# Patient Record
Sex: FEMALE | Race: WHITE | NOT HISPANIC OR LATINO | URBAN - METROPOLITAN AREA
[De-identification: names, ages, dates, MRNs, and addresses within clinical notes are randomized per-mention and may not be internally consistent; named-entity substitution may affect disease eponyms.]

---

## 2017-12-13 ENCOUNTER — OFFICE (OUTPATIENT)
Dept: URBAN - METROPOLITAN AREA CLINIC 33 | Facility: CLINIC | Age: 67
End: 2017-12-13

## 2017-12-13 VITALS
HEART RATE: 77 BPM | WEIGHT: 132 LBS | DIASTOLIC BLOOD PRESSURE: 71 MMHG | HEIGHT: 57 IN | SYSTOLIC BLOOD PRESSURE: 92 MMHG

## 2017-12-13 DIAGNOSIS — K21.9 GERD: ICD-10-CM

## 2017-12-13 DIAGNOSIS — Z12.11 SCREENING FOR COLONIC NEOPLASIA: ICD-10-CM

## 2017-12-13 PROCEDURE — 99205 OFFICE O/P NEW HI 60 MIN: CPT | Performed by: SPECIALIST

## 2017-12-13 RX ORDER — SODIUM SULFATE, POTASSIUM SULFATE, MAGNESIUM SULFATE 17.5; 3.13; 1.6 G/ML; G/ML; G/ML
SOLUTION, CONCENTRATE ORAL
Qty: 1 | Status: COMPLETED
Start: 2017-12-13 | End: 2018-04-09

## 2017-12-13 NOTE — SERVICEHPINOTES
The patient is seen for evaluation of constipation.    Notes the onset of constipation   many  years   ago.  Symptoms started   abruptly  .   Currently, the patient evacuates stool   1   times per   day  .    The stools are   hard  .   They are typically    in color.    Associated symptoms include   .  Symptoms are alleviated with   .  The patient has previously medicated symptoms with   .   The patient has undergone a colonoscopy    ago.  Findings on that exam included   .     The patient is seen for the evaluation of suspected GERD.    Noted the onset of   heartburn  many  years   ago  .   Symptoms have been occurring   3 - 5   time(s) per   day  .    During a given day, they are most prevalent   .    They are exacerbated by   .   In the past, the patient has tried   .   Currently takes    dosed   .   On this therapy, symptom response has been   .    Associated symptoms include   .      Has also noted atypical (non-esophageal) symptoms including   .    A prior EGD has been performed and showed   .

## 2018-01-16 ENCOUNTER — AMBULATORY SURGICAL CENTER (OUTPATIENT)
Dept: URBAN - METROPOLITAN AREA SURGERY 24 | Facility: SURGERY | Age: 68
End: 2018-01-16

## 2018-01-16 VITALS
OXYGEN SATURATION: 98 % | SYSTOLIC BLOOD PRESSURE: 160 MMHG | WEIGHT: 148 LBS | TEMPERATURE: 97.6 F | HEART RATE: 72 BPM | RESPIRATION RATE: 18 BRPM | HEIGHT: 57 IN | DIASTOLIC BLOOD PRESSURE: 83 MMHG

## 2018-01-16 DIAGNOSIS — Z12.11 ENCOUNTER FOR SCREENING FOR MALIGNANT NEOPLASM OF COLON: ICD-10-CM

## 2018-01-16 DIAGNOSIS — K64.1 SECOND DEGREE HEMORRHOIDS: ICD-10-CM

## 2018-01-16 DIAGNOSIS — K44.9 DIAPHRAGMATIC HERNIA WITHOUT OBSTRUCTION OR GANGRENE: ICD-10-CM

## 2018-01-16 DIAGNOSIS — K29.70 GASTRITIS, UNSPECIFIED, WITHOUT BLEEDING: ICD-10-CM

## 2018-01-16 PROBLEM — D12.3 BENIGN NEOPLASM OF TRANSVERSE COLON: Status: ACTIVE | Noted: 2018-01-16

## 2018-01-16 PROBLEM — R10.13 EPIGASTRIC PAIN: Status: ACTIVE | Noted: 2018-01-16

## 2018-01-16 PROBLEM — K20.9 ESOPHAGITIS, UNSPECIFIED: Status: ACTIVE | Noted: 2018-01-16

## 2018-01-16 PROBLEM — K62.1 RECTAL POLYP: Status: ACTIVE | Noted: 2018-01-16

## 2018-01-16 PROBLEM — R12 HEARTBURN: Status: ACTIVE | Noted: 2018-01-16

## 2018-01-16 PROBLEM — D12.5 BENIGN NEOPLASM OF SIGMOID COLON: Status: ACTIVE | Noted: 2018-01-16

## 2018-01-16 PROBLEM — K57.30 DVRTCLOS OF LG INT W/O PERFORATION OR ABSCESS W/O BLEEDING: Status: ACTIVE | Noted: 2018-01-16

## 2018-01-16 PROCEDURE — 43239 EGD BIOPSY SINGLE/MULTIPLE: CPT | Performed by: SPECIALIST

## 2018-01-16 PROCEDURE — 45385 COLONOSCOPY W/LESION REMOVAL: CPT | Performed by: SPECIALIST

## 2018-01-16 PROCEDURE — 45380 COLONOSCOPY AND BIOPSY: CPT | Mod: 59 | Performed by: SPECIALIST

## 2018-04-09 ENCOUNTER — OFFICE (OUTPATIENT)
Dept: URBAN - METROPOLITAN AREA CLINIC 33 | Facility: CLINIC | Age: 68
End: 2018-04-09

## 2018-04-09 VITALS — DIASTOLIC BLOOD PRESSURE: 68 MMHG | SYSTOLIC BLOOD PRESSURE: 110 MMHG | WEIGHT: 148 LBS | HEIGHT: 57 IN

## 2018-04-09 DIAGNOSIS — K44.9 DIAPHRAGMATIC HERNIA WITHOUT OBSTRUCTION OR GANGRENE: ICD-10-CM

## 2018-04-09 DIAGNOSIS — K21.9 GERD: ICD-10-CM

## 2018-04-09 PROCEDURE — 99214 OFFICE O/P EST MOD 30 MIN: CPT | Performed by: SPECIALIST

## 2018-04-09 NOTE — SERVICEHPINOTES
The patient is seen for the evaluation of suspected GERD.    Noted the onset of   heartburn  several  years   ago  .   Symptoms have been occurring   several   time(s) per   day  .    During a given day, they are most prevalent   .    They are exacerbated by   .   In the past, the patient has tried   .   Currently takes    dosed   .   On this therapy, symptom response has been   .    Associated symptoms include   .      Has also noted atypical (non-esophageal) symptoms including   .    A prior EGD has been performed and showed   .

## 2021-01-26 ENCOUNTER — HOSPITAL ENCOUNTER (OUTPATIENT)
Dept: HOSPITAL 54 - MSC | Age: 71
Discharge: HOME | End: 2021-01-26
Attending: ANESTHESIOLOGY
Payer: MEDICARE

## 2021-01-26 DIAGNOSIS — M79.641: ICD-10-CM

## 2021-01-26 DIAGNOSIS — M50.10: Primary | ICD-10-CM

## 2021-01-26 DIAGNOSIS — M50.20: ICD-10-CM

## 2021-03-31 ENCOUNTER — OFFICE (OUTPATIENT)
Dept: URBAN - METROPOLITAN AREA CLINIC 33 | Facility: CLINIC | Age: 71
End: 2021-03-31

## 2021-03-31 VITALS
WEIGHT: 135 LBS | SYSTOLIC BLOOD PRESSURE: 123 MMHG | TEMPERATURE: 97.7 F | DIASTOLIC BLOOD PRESSURE: 65 MMHG | HEIGHT: 57 IN

## 2021-03-31 DIAGNOSIS — K21.9 GERD: ICD-10-CM

## 2021-03-31 DIAGNOSIS — Z86.010 PERSONAL HISTORY COLON POLYPS: ICD-10-CM

## 2021-03-31 PROCEDURE — 99215 OFFICE O/P EST HI 40 MIN: CPT | Performed by: SPECIALIST

## 2021-03-31 RX ORDER — MAGESIUM CITRATE 1.75 G/29.6ML
LIQUID ORAL
Qty: 10 | Status: COMPLETED
Start: 2021-03-31 | End: 2021-05-19

## 2021-03-31 RX ORDER — SODIUM PICOSULFATE, MAGNESIUM OXIDE, AND ANHYDROUS CITRIC ACID 10; 3.5; 12 MG/160ML; G/160ML; G/160ML
LIQUID ORAL
Qty: 1 | Status: COMPLETED
Start: 2021-03-31 | End: 2021-05-19

## 2021-03-31 NOTE — SERVICEHPINOTES
The patient is seen for the evaluation of suspected GERD.    Noted the onset of   heartburn  several  years   ago  .   Symptoms have been occurring   a few   time(s) per   day  .    During a given day, they are most prevalent   shortly after eating meals  .    They are exacerbated by   nothing specific  .   In the past, the patient has tried   Prilosec OTC  .   Currently takes    dosed   .   On this therapy, symptom response has been   good  .    Associated symptoms include   .      Has also noted atypical (non-esophageal) symptoms including   .    A prior EGD has been performed and showed   .   Patient is seen to consider surveillance colonoscopy.  The last colonoscopy was performed in _2018__. The patient has no family history of colon cancer or other GI diseases.  Patient denies abdominal pain, change in bowel habits, constipation, diarrhea, rectal bleeding, nausea, vomiting, or weight loss. Remainder of GI systems review is unremarkable.

## 2021-04-20 ENCOUNTER — AMBULATORY SURGICAL CENTER (OUTPATIENT)
Dept: URBAN - METROPOLITAN AREA SURGERY 24 | Facility: SURGERY | Age: 71
End: 2021-04-20

## 2021-04-20 VITALS
HEIGHT: 57 IN | OXYGEN SATURATION: 99 % | RESPIRATION RATE: 18 BRPM | TEMPERATURE: 97.4 F | HEART RATE: 88 BPM | DIASTOLIC BLOOD PRESSURE: 91 MMHG | WEIGHT: 135 LBS | SYSTOLIC BLOOD PRESSURE: 159 MMHG

## 2021-04-20 DIAGNOSIS — R12 HEARTBURN: ICD-10-CM

## 2021-04-20 DIAGNOSIS — K44.9 DIAPHRAGMATIC HERNIA WITHOUT OBSTRUCTION OR GANGRENE: ICD-10-CM

## 2021-04-20 DIAGNOSIS — K57.30 DVRTCLOS OF LG INT W/O PERFORATION OR ABSCESS W/O BLEEDING: ICD-10-CM

## 2021-04-20 PROBLEM — K22.8 OTHER SPECIFIED DISEASES OF ESOPHAGUS: Status: ACTIVE | Noted: 2021-04-20

## 2021-04-20 PROBLEM — K63.89 OTHER SPECIFIED DISEASES OF INTESTINE: Status: ACTIVE | Noted: 2021-04-20

## 2021-04-20 PROBLEM — K63.5 POLYP OF COLON: Status: ACTIVE | Noted: 2021-04-20

## 2021-04-20 PROBLEM — Z86.010 SURVEILLANCE DUE TO PRIOR COLONIC NEOPLASIA: Status: ACTIVE | Noted: 2021-04-20

## 2021-04-20 PROCEDURE — 43239 EGD BIOPSY SINGLE/MULTIPLE: CPT | Performed by: SPECIALIST

## 2021-04-20 PROCEDURE — 45385 COLONOSCOPY W/LESION REMOVAL: CPT | Performed by: SPECIALIST

## 2021-04-20 PROCEDURE — 45380 COLONOSCOPY AND BIOPSY: CPT | Mod: 59 | Performed by: SPECIALIST

## 2021-05-19 ENCOUNTER — OFFICE (OUTPATIENT)
Dept: URBAN - METROPOLITAN AREA CLINIC 33 | Facility: CLINIC | Age: 71
End: 2021-05-19

## 2021-05-19 VITALS
SYSTOLIC BLOOD PRESSURE: 119 MMHG | TEMPERATURE: 98.6 F | DIASTOLIC BLOOD PRESSURE: 57 MMHG | WEIGHT: 135 LBS | HEIGHT: 57 IN | HEART RATE: 59 BPM

## 2021-05-19 DIAGNOSIS — K21.9 GERD: ICD-10-CM

## 2021-05-19 DIAGNOSIS — K44.9 DIAPHRAGMATIC HERNIA WITHOUT OBSTRUCTION OR GANGRENE: ICD-10-CM

## 2021-05-19 DIAGNOSIS — Z86.010 PERSONAL HISTORY COLON POLYPS: ICD-10-CM

## 2021-05-19 PROCEDURE — 99215 OFFICE O/P EST HI 40 MIN: CPT | Performed by: SPECIALIST

## 2021-05-19 RX ORDER — FAMOTIDINE 40 MG/1
40 TABLET, FILM COATED ORAL
Qty: 30 | Status: ACTIVE
Start: 2021-05-19

## 2021-05-19 NOTE — SERVICEHPINOTES
Here to discuss results of EGD/COLONOSCOPY. BRThe patient is seen for the evaluation of suspected GERD.    Noted the onset of   heartburn  several  years   ago  .   Symptoms have been occurring   1 - 3   time(s) per   month  .    During a given day, they are most prevalent   shortly after eating meals  .    They are exacerbated by   eating meals late at night  .   In the past, the patient has tried   Omeprazole  .   Currently takes   none   dosed   none  .   On this therapy, symptom response has been   NA  .    Associated symptoms include   epigastric pain  .      Has also noted atypical (non-esophageal) symptoms including   .    A prior EGD has been performed and showed   a hiatal hernia and reflux esophagitis  .

## 2021-12-07 ENCOUNTER — HOSPITAL ENCOUNTER (OUTPATIENT)
Dept: HOSPITAL 54 - MSC | Age: 71
Discharge: HOME | End: 2021-12-07
Attending: ANESTHESIOLOGY
Payer: MEDICARE

## 2021-12-07 DIAGNOSIS — M62.830: ICD-10-CM

## 2021-12-07 DIAGNOSIS — M50.10: Primary | ICD-10-CM

## 2021-12-07 DIAGNOSIS — Z79.891: ICD-10-CM

## 2021-12-07 DIAGNOSIS — M50.20: ICD-10-CM

## 2021-12-07 DIAGNOSIS — M54.16: ICD-10-CM
